# Patient Record
Sex: FEMALE | Race: WHITE | Employment: FULL TIME | URBAN - METROPOLITAN AREA
[De-identification: names, ages, dates, MRNs, and addresses within clinical notes are randomized per-mention and may not be internally consistent; named-entity substitution may affect disease eponyms.]

---

## 2017-05-18 PROBLEM — O92.29 POSTPARTUM NIPPLE PAIN: Status: ACTIVE | Noted: 2017-05-18

## 2017-09-22 PROBLEM — O92.29 POSTPARTUM NIPPLE PAIN: Status: RESOLVED | Noted: 2017-05-18 | Resolved: 2017-09-22

## 2017-09-22 PROBLEM — M75.40 IMPINGEMENT SYNDROME OF SHOULDER REGION: Status: ACTIVE | Noted: 2017-09-22

## 2017-09-22 PROBLEM — M54.9 ACUTE UPPER BACK PAIN: Status: ACTIVE | Noted: 2017-09-22

## 2017-09-22 PROBLEM — M75.20 BICEPS TENDONITIS: Status: ACTIVE | Noted: 2017-09-22

## 2018-03-09 PROBLEM — M75.20 BICEPS TENDONITIS: Status: RESOLVED | Noted: 2017-09-22 | Resolved: 2018-03-09

## 2018-03-09 PROBLEM — M75.40 IMPINGEMENT SYNDROME OF SHOULDER REGION: Status: RESOLVED | Noted: 2017-09-22 | Resolved: 2018-03-09

## 2018-03-09 PROBLEM — K59.00 CONSTIPATION: Status: ACTIVE | Noted: 2018-03-09

## 2018-03-09 PROBLEM — E66.01 OBESITY, MORBID (HCC): Status: ACTIVE | Noted: 2018-03-09

## 2018-03-09 PROBLEM — M54.9 ACUTE UPPER BACK PAIN: Status: RESOLVED | Noted: 2017-09-22 | Resolved: 2018-03-09

## 2018-03-09 PROBLEM — K21.9 GASTROESOPHAGEAL REFLUX DISEASE WITHOUT ESOPHAGITIS: Status: ACTIVE | Noted: 2018-03-09

## 2018-03-16 ENCOUNTER — HOSPITAL ENCOUNTER (OUTPATIENT)
Dept: ULTRASOUND IMAGING | Age: 33
Discharge: HOME | End: 2018-03-16
Attending: FAMILY MEDICINE
Payer: COMMERCIAL

## 2018-03-16 DIAGNOSIS — R10.2 PELVIC PAIN: ICD-10-CM

## 2018-03-16 DIAGNOSIS — N91.2 AMENORRHEA: ICD-10-CM

## 2018-03-16 PROCEDURE — 76830 TRANSVAGINAL US NON-OB: CPT

## 2018-03-21 ENCOUNTER — APPOINTMENT (OUTPATIENT)
Dept: PHYSICAL THERAPY | Age: 33
End: 2018-03-21

## 2018-04-25 PROBLEM — K76.0 FATTY LIVER: Status: ACTIVE | Noted: 2018-04-18

## 2022-03-18 PROBLEM — E66.01 OBESITY, MORBID (HCC): Status: ACTIVE | Noted: 2018-03-09

## 2022-03-19 PROBLEM — K59.00 CONSTIPATION: Status: ACTIVE | Noted: 2018-03-09

## 2022-03-19 PROBLEM — K21.9 GASTROESOPHAGEAL REFLUX DISEASE WITHOUT ESOPHAGITIS: Status: ACTIVE | Noted: 2018-03-09

## 2022-03-19 PROBLEM — K76.0 FATTY LIVER: Status: ACTIVE | Noted: 2018-04-18

## 2024-01-04 ENCOUNTER — OFFICE VISIT (OUTPATIENT)
Dept: ORTHOPEDIC SURGERY | Age: 39
End: 2024-01-04

## 2024-01-04 DIAGNOSIS — G57.91 UNSPECIFIED MONONEUROPATHY OF RIGHT LOWER LIMB: ICD-10-CM

## 2024-01-04 DIAGNOSIS — M79.672 LEFT FOOT PAIN: ICD-10-CM

## 2024-01-04 DIAGNOSIS — M79.671 RIGHT FOOT PAIN: Primary | ICD-10-CM

## 2024-01-04 RX ORDER — FLUTICASONE PROPIONATE AND SALMETEROL 250; 50 UG/1; UG/1
1 POWDER RESPIRATORY (INHALATION)
COMMUNITY
Start: 2023-12-13

## 2024-01-04 RX ORDER — AMOXICILLIN AND CLAVULANATE POTASSIUM 875; 125 MG/1; MG/1
1 TABLET, FILM COATED ORAL 2 TIMES DAILY
Qty: 28 TABLET | Refills: 2 | Status: SHIPPED | OUTPATIENT
Start: 2024-01-04 | End: 2024-01-18

## 2024-01-04 RX ORDER — TORSEMIDE 20 MG/1
20 TABLET ORAL DAILY
COMMUNITY
Start: 2024-01-02

## 2024-01-04 RX ORDER — ALBUTEROL SULFATE 90 UG/1
2 AEROSOL, METERED RESPIRATORY (INHALATION) EVERY 6 HOURS PRN
COMMUNITY
Start: 2023-12-01

## 2024-01-04 RX ORDER — INDOMETHACIN 25 MG/1
25-50 CAPSULE ORAL 3 TIMES DAILY PRN
COMMUNITY
Start: 2023-12-28

## 2024-01-04 RX ORDER — LIDOCAINE 5% 5 G/100G
CREAM TOPICAL
Qty: 45 G | Refills: 2 | Status: SHIPPED | OUTPATIENT
Start: 2024-01-04

## 2024-01-04 RX ORDER — SPIRONOLACTONE 25 MG/1
25 TABLET ORAL DAILY
COMMUNITY
Start: 2024-01-02 | End: 2025-01-01

## 2024-01-04 NOTE — PROGRESS NOTES
The patient was prescribed a walker boot for the patient's bilateral foot. The patient wears a size NA shoe and I fitted them with a XL size boot. The patient was fitted and instructed on the use of prescribed walker boot. I explained how to fit themselves and that the plastic flexible piece should always be on the front of the boot and secured by the Velcro straps on top. The air bladder in the boot was adjusted according to proper fit and comfort. The patient walked a short distance and acknowledged satisfaction with current fit. I also explained that they need a heel lift or a higher heeled shoe for the uninvolved LE to help normalize gait and avoid excessive low back stress/strain due to leg length inequality created from walker boot.    The patient was also prescribed and fitted with bilateral boot sleeves, size medium.     Patient read and signed documenting they understand and agree to [facility]'s current DME return policy.

## 2024-01-04 NOTE — PROGRESS NOTES
Name: Bella Tijerina  YOB: 1985  Gender: female  MRN: [id number]    CC: Right ankle/foot pain: Left foot pain    HPI:   2012: Bilateral ankle/foot injuries: Left treated with rehab: Right midfoot ORIF 2013 2013-2024: Recurrent medial ankle pain, popping, catching: Progressive medial ankle to dorsal medial foot pain  November 2023: She reports swimming jumping into a pool and injuring her left foot.  01/04/2024: Presents with 2 concerns:  1.  Right acute on chronic medial ankle/dorsal foot pain  2.  Left dorsal lateral foot pain status post trauma    ROS/Meds/PSH/PMH/FH/SH: reviewed today    Tobacco:  reports that she quit smoking about 16 years ago. Her smoking use included cigarettes. She has never used smokeless tobacco.     Physical Examination:  Patient appears to be alert and oriented with acceptable appearance.  No obvious distress or SOB  CV: appears to have acceptable vascular color and capillary refill  Neuro: appears to have mostly intact light touch sensation with some loss dorsomedial right foot  Skin: Bilateral lower extremity nonpitting edema; mild redness;  MS: Standing: Bunions: Gait limited with O2 dependency:  Left = dorsal lateral cuboid centered pain  Right = anterior medial ankle pain; naviculocuneiform pain; minimal neuralgia    XR: Right: Left: Standing AP lateral mortise ankle plus AP oblique foot taken today with:  Bilateral = plantar and posterior heel enthesopathy; os trigonum;  Left = moderate bunion; moderate naviculocuneiform sagging; exostosis medial malleolus and 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 Ronak Albert:

## 2024-02-14 ENCOUNTER — TELEPHONE (OUTPATIENT)
Dept: ORTHOPEDIC SURGERY | Age: 39
End: 2024-02-14

## 2024-02-27 ENCOUNTER — OFFICE VISIT (OUTPATIENT)
Dept: ORTHOPEDIC SURGERY | Age: 39
End: 2024-02-27
Payer: MEDICAID

## 2024-02-27 DIAGNOSIS — M76.821 POSTERIOR TIBIAL TENDINITIS OF RIGHT LOWER EXTREMITY: Primary | ICD-10-CM

## 2024-02-27 DIAGNOSIS — M19.171 POST-TRAUMATIC OSTEOARTHRITIS OF RIGHT ANKLE: ICD-10-CM

## 2024-02-27 PROCEDURE — 99213 OFFICE O/P EST LOW 20 MIN: CPT | Performed by: ORTHOPAEDIC SURGERY

## 2024-02-27 RX ORDER — METHOCARBAMOL 750 MG/1
750 TABLET, FILM COATED ORAL 3 TIMES DAILY PRN
COMMUNITY
Start: 2024-02-05

## 2024-02-27 RX ORDER — LOSARTAN POTASSIUM 25 MG/1
25 TABLET ORAL DAILY
COMMUNITY

## 2024-02-27 RX ORDER — LEVALBUTEROL TARTRATE 45 UG/1
AEROSOL, METERED ORAL
COMMUNITY
Start: 2024-01-10

## 2024-02-27 NOTE — PROGRESS NOTES
The patient was prescribed a Wraptor brace for the patient's rightfoot. The patient wears a size 8.5 shoe and I fitted the patient with a XL brace. I explained how to fit the brace properly by pulling the lace tabs across top of foot first then under arch and lastly pulling the strap up firmly and attaching to the lateral Velcro strip. Thus forming a figure 8 across the ankle joint. Once the figure 8 is completed they are to secure the top (short circumferential) straps to help avoid the straps from loosening with normal wear.  The patient was able to demonstrate proper fitting in office to ensure compliance with device and acknowledged satisfaction with current fit. Patient read and signed documenting they understand and agree to [facility]s current DME return policy.   
neuralgia status post midfoot fusion 2013: Prescribed compounding cream and discussed oral gabapentin, alternative steroids and on natural medicine regimen  12/28/2023: No agreeable ED: Reflects right ankle surgery with right ankle pain diagnosed with acute right ankle pain:  12/28/2023: [facility] ED PMH: Chronic HTN: Liver disease: Other diagnoses as listed  12/28/2023: NG ED: XR impression: No acute osseous abnormality [images with retained hardware but no acute fracture noted]  Injection: Hold    02/19/2024: Right ankle MRI scan without contrast: Radiology impression:  1.  Postoperative changes seen in the midfoot limiting evaluation with extensive susceptibility artifact  2.  Irregularity identified along the medial aspect of the talar dome with low T1 signal and faint increased T2 signal suggesting an osteochondral lesion.  No depression or loose fragment he has however identified.  Osteoarthritic changes are seen at the tibiotalar joint with early spurring present  3.  Anterior syndesmotic with fluid seen tracking along the distal tibiofibular joint  4.  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 We explored treatment options.     Her MRI scan reveals mild PTT tenosynovitis  Her exam; however, reveals no PTTD and exam points to her ankle and not PTT as the source of her pain  Regarding her MRI scan findings, her chronic medial ankle pain with popping and catching is consistent with arthritis, exostoses and suspected medial talar dome

## 2024-03-13 ENCOUNTER — HOSPITAL ENCOUNTER (OUTPATIENT)
Dept: PHYSICAL THERAPY | Age: 39
Setting detail: RECURRING SERIES
Discharge: HOME | End: 2024-03-16
Attending: ORTHOPAEDIC SURGERY
Payer: MEDICAID

## 2024-03-13 DIAGNOSIS — M25.571 PAIN IN RIGHT ANKLE AND JOINTS OF RIGHT FOOT: Primary | ICD-10-CM

## 2024-03-13 DIAGNOSIS — R26.2 DIFFICULTY IN WALKING, NOT ELSEWHERE CLASSIFIED: ICD-10-CM

## 2024-03-13 PROCEDURE — 97162 PT EVAL MOD COMPLEX 30 MIN: CPT

## 2024-03-13 NOTE — PROGRESS NOTES
Bella Tijerina  : 1985  Primary: [employer] (Medicaid Managed)  Secondary:  [facility] [place] [address]  Phone: [phone]  Fax: [phone] Plan Frequency: 2 times a week    Plan of Care/Certification Expiration Date: 24        Plan of Care/Certification Expiration Date:  Plan of Care/Certification Expiration Date: 24    Frequency/Duration: Plan Frequency: 2 times a week      Time In/Out:   Time In: 0245  Time Out: 0330      PT Visit Info:         Visit Count:  1    OUTPATIENT PHYSICAL THERAPY:   Treatment Note 3/13/2024       Episode  (R ankle pain )               Treatment Diagnosis:    Pain in right ankle and joints of right foot  Difficulty in walking, not elsewhere classified  Medical/Referring Diagnosis:    Posterior tibial tendinitis of right lower extremity [M76.821]  Post-traumatic osteoarthritis of right ankle [M19.171]      Referring Physician:  Ronak Vazquez MD MD Orders:  PT Eval and Treat   Return MD Appt:  tbd    Date of Onset:  Onset Date: 24     Allergies:   Patient has no known allergies.  Restrictions/Precautions:   None      Interventions Planned (Treatment may consist of any combination of the following):  Current Treatment Recommendations: Strengthening; Balance training; ROM; IADL training; Stair training; Manual; Pain management; Return to work related activity; Home exercise program    See Assessment Note    Subjective Comments:   \"I have a lot of trouble walking and getting the ankle comfortable.\"   Initial Pain Level::      /10  Post Session Pain Level:        /10  Medications Last Reviewed:  3/13/2024  Updated Objective Findings:  See Evaluation Note from today  Treatment   TREATMENT:   THERAPEUTIC ACTIVITY: ( see below for minutes): Therapeutic activities per grid below to improve mobility, strength, balance and coordination. Required minimal visual, verbal, manual and

## 2024-03-15 ENCOUNTER — HOSPITAL ENCOUNTER (OUTPATIENT)
Dept: PHYSICAL THERAPY | Age: 39
Setting detail: RECURRING SERIES
Discharge: HOME | End: 2024-03-18
Attending: ORTHOPAEDIC SURGERY
Payer: MEDICAID

## 2024-03-15 PROCEDURE — 97110 THERAPEUTIC EXERCISES: CPT

## 2024-03-15 PROCEDURE — 97016 VASOPNEUMATIC DEVICE THERAPY: CPT

## 2024-03-15 NOTE — PROGRESS NOTES
improve independence and safety with daily activities .  THERAPEUTIC EXERCISE: (see below for minutes): Exercises per grid below to improve mobility, strength, balance and coordination. Required minimal verbal and manual cues to promote proper body alignment, promote proper body posture and promote proper body mechanics. Progressed resistance, range, repetitions and complexity of movement as indicated.  MANUAL THERAPY: (see below for minutes): Joint mobilization and Soft tissue mobilization was utilized and necessary because of the patient's restricted joint motion, painful spasm, loss of articular motion and restricted motion of soft tissue.   MODALITIES: (see below for minutes): to decrease pain   SELF CARE: (see below for minutes): Procedure(s) (per grid) utilized to improve and/or restore self-care/home management as related to dressing, bathing and grooming. Required minimal verbal cueing to facilitate activities of daily living skills and compensatory activities    Date: 3-13-24  (EVAL only) 3-15-24  (Visit 2)       Modalities:  15 mins       Gameready   15 mins R foot                       Therapeutic Exercise:  30 mins       Seated arch lifts  X10 with 5SH  (Discontinued for HEP due to pain)       Seated heel raises  3x10  Repeat with towel roll under toes       Seated toe press  X10 with 5SH  Toe press with red band 3x10       Seated calf stretch with strap  3x15SH  Repeat       Seated nghia disc   2x20 (pain medial ankle)   DF/PF  Lateral   Circles       Manual mobilization R ankle   PROM and mobilization R ankle prior to exercises       Toe abduction resisted   Seated with foot ER resisted red band at great toe         Towel toe curls 3x10         Ankle 4 way with red band x 20 light resistance      Manual Therapy:                        Functional Activities:                                            Treatment/Session Summary:    Treatment Assessment:   Changed toe yoga exercise to toe press with red

## 2024-03-19 ENCOUNTER — HOSPITAL ENCOUNTER (OUTPATIENT)
Dept: PHYSICAL THERAPY | Age: 39
Setting detail: RECURRING SERIES
Discharge: HOME | End: 2024-03-22
Attending: ORTHOPAEDIC SURGERY
Payer: MEDICAID

## 2024-03-19 PROCEDURE — 97016 VASOPNEUMATIC DEVICE THERAPY: CPT

## 2024-03-19 PROCEDURE — 97110 THERAPEUTIC EXERCISES: CPT

## 2024-03-19 NOTE — PROGRESS NOTES
Bella Tijerina  : 1985  Primary: [employer] (Medicaid Managed)  Secondary:  [facility] [place] [address]  Phone: [phone]  Fax: [phone] Plan Frequency: 2 times a week    Plan of Care/Certification Expiration Date: 24        Plan of Care/Certification Expiration Date:  Plan of Care/Certification Expiration Date: 24    Frequency/Duration: Plan Frequency: 2 times a week      Time In/Out:   Time In: 1203  Time Out: 1251      PT Visit Info:         Visit Count:  3    OUTPATIENT PHYSICAL THERAPY:   Treatment Note 3/19/2024       Episode  (R ankle pain )               Treatment Diagnosis:    Pain in right ankle and joints of right foot  Difficulty in walking, not elsewhere classified  Medical/Referring Diagnosis:    Posterior tibial tendinitis of right lower extremity [M76.821]  Post-traumatic osteoarthritis of right ankle [M19.171]      Referring Physician:  Ronak Vazquez MD MD Orders:  PT Eval and Treat   Return MD Appt:  tbd    Date of Onset:  Onset Date: 24     Allergies:   Patient has no known allergies.  Restrictions/Precautions:   None      Interventions Planned (Treatment may consist of any combination of the following):  Current Treatment Recommendations: Strengthening; Balance training; ROM; IADL training; Stair training; Manual; Pain management; Return to work related activity; Home exercise program    See Assessment Note    Subjective Comments:   Bella reports ankle pain (posterior), forefoot pain during seated calf raises. Foot is about the same so far. Cryotherapy helpful in moderating pain.   Initial Pain Level::     5 /10  Post Session Pain Level:       4 /10  Medications Last Reviewed:  3/19/2024  Updated Objective Findings:  None Today  Treatment   TREATMENT:   THERAPEUTIC ACTIVITY: ( see below for minutes): Therapeutic activities per grid below to improve mobility, strength,

## 2024-03-22 ENCOUNTER — HOSPITAL ENCOUNTER (OUTPATIENT)
Dept: PHYSICAL THERAPY | Age: 39
Setting detail: RECURRING SERIES
Discharge: HOME | End: 2024-03-25
Attending: ORTHOPAEDIC SURGERY
Payer: MEDICAID

## 2024-03-22 PROCEDURE — 97016 VASOPNEUMATIC DEVICE THERAPY: CPT

## 2024-03-22 PROCEDURE — 97110 THERAPEUTIC EXERCISES: CPT

## 2024-03-22 NOTE — PROGRESS NOTES
Bella Tijerina  : 1985  Primary: [employer] (Medicaid Managed)  Secondary:  [facility] [place] [address]  Phone: [phone]  Fax: [phone] Plan Frequency: 2 times a week    Plan of Care/Certification Expiration Date: 24        Plan of Care/Certification Expiration Date:  Plan of Care/Certification Expiration Date: 24    Frequency/Duration: Plan Frequency: 2 times a week      Time In/Out:   Time In: 0800  Time Out: 0900      PT Visit Info:         Visit Count:  4    OUTPATIENT PHYSICAL THERAPY:   Treatment Note 3/22/2024       Episode  (R ankle pain )               Treatment Diagnosis:    Pain in right ankle and joints of right foot  Difficulty in walking, not elsewhere classified  Medical/Referring Diagnosis:    Posterior tibial tendinitis of right lower extremity [M76.821]  Post-traumatic osteoarthritis of right ankle [M19.171]      Referring Physician:  Ronak Vazquez MD MD Orders:  PT Eval and Treat   Return MD Appt:  tbd    Date of Onset:  Onset Date: 24     Allergies:   Patient has no known allergies.  Restrictions/Precautions:   None      Interventions Planned (Treatment may consist of any combination of the following):  Current Treatment Recommendations: Strengthening; Balance training; ROM; IADL training; Stair training; Manual; Pain management; Return to work related activity; Home exercise program    See Assessment Note    Subjective Comments:   Bella reports much less ankle pain today.  Initial Pain Level::    mild pain in R ankle  Post Session Pain Level:         Medications Last Reviewed:  3/22/2024  Updated Objective Findings:  None Today  Treatment   TREATMENT:   THERAPEUTIC ACTIVITY: ( see below for minutes): Therapeutic activities per grid below to improve mobility, strength, balance and coordination. Required minimal visual, verbal, manual and tactile cues to improve independence

## 2024-03-25 ENCOUNTER — APPOINTMENT (OUTPATIENT)
Dept: PHYSICAL THERAPY | Age: 39
End: 2024-03-25
Attending: ORTHOPAEDIC SURGERY
Payer: MEDICAID

## 2024-03-26 ENCOUNTER — HOSPITAL ENCOUNTER (OUTPATIENT)
Dept: PHYSICAL THERAPY | Age: 39
Setting detail: RECURRING SERIES
Discharge: HOME | End: 2024-03-29
Attending: ORTHOPAEDIC SURGERY
Payer: MEDICAID

## 2024-03-26 PROCEDURE — 97110 THERAPEUTIC EXERCISES: CPT

## 2024-03-26 PROCEDURE — 97016 VASOPNEUMATIC DEVICE THERAPY: CPT

## 2024-03-26 NOTE — PROGRESS NOTES
grid below to improve mobility, strength, balance and coordination. Required minimal visual, verbal, manual and tactile cues to improve independence and safety with daily activities .  THERAPEUTIC EXERCISE: (see below for minutes): Exercises per grid below to improve mobility, strength, balance and coordination. Required minimal verbal and manual cues to promote proper body alignment, promote proper body posture and promote proper body mechanics. Progressed resistance, range, repetitions and complexity of movement as indicated.  MANUAL THERAPY: (see below for minutes): Joint mobilization and Soft tissue mobilization was utilized and necessary because of the patient's restricted joint motion, painful spasm, loss of articular motion and restricted motion of soft tissue.   MODALITIES: (see below for minutes): to decrease pain   SELF CARE: (see below for minutes): Procedure(s) (per grid) utilized to improve and/or restore self-care/home management as related to dressing, bathing and grooming. Required minimal verbal cueing to facilitate activities of daily living skills and compensatory activities    Date: 3-13-24  (EVAL only) 3-15-24  (Visit 2)  3/19/24 3/22/24 (visit 4) 3/26/24 (visit 5)   Modalities:  15 mins  11 min  15 min 15 min   Gameready   15 mins R foot  Vaso cryo x 11 min, 34 deg F, mod compression R ankle/foot R ankle, low compression, coldest setting R ankle elevated, low compression, coldest setting                   Therapeutic Exercise:  30 mins  37 min 40 min 43 min   Seated arch lifts  X10 with 5SH  (Discontinued for HEP due to pain)    Shuffle walk x 2 laps near //bars   Seated heel raises  3x10  Repeat with towel roll under toes  30x bilat, feet on aires Seated with 10# 3x10 Seated 3x10, 10#   Seated toe press  X10 with 5SH  Toe press with red band 3x10  Toe presses w/ red tband x 30  Toe presses into red band x30 Toe presses into green band x30   Seated calf stretch with strap  3x15SH  Repeat

## 2024-03-27 ENCOUNTER — APPOINTMENT (OUTPATIENT)
Dept: PHYSICAL THERAPY | Age: 39
End: 2024-03-27
Attending: ORTHOPAEDIC SURGERY
Payer: MEDICAID

## 2024-03-29 ENCOUNTER — HOSPITAL ENCOUNTER (OUTPATIENT)
Dept: PHYSICAL THERAPY | Age: 39
Setting detail: RECURRING SERIES
Discharge: HOME | End: 2024-04-01
Attending: ORTHOPAEDIC SURGERY
Payer: MEDICAID

## 2024-03-29 PROCEDURE — 97110 THERAPEUTIC EXERCISES: CPT

## 2024-03-29 PROCEDURE — 97016 VASOPNEUMATIC DEVICE THERAPY: CPT

## 2024-03-29 NOTE — PROGRESS NOTES
presses into green band x30 Toe presses into green band x30   Seated calf stretch with strap  3x15SH  Repeat    Manual stretching  Manual stretching   Seated nghia disc   2x20 (pain medial ankle)   DF/PF  Lateral   Circles  D/P tipping x 2 min, cw/ccw circles x 3 min  A/P x30, and circles x30 each, seated A/P x30, circles x30 ea seated Standing MOBO 2/4 with weight on L LE x30 A/P tipping   Manual mobilization R ankle   PROM and mobilization R ankle prior to exercises  Repeat  PROM and mobilizationto R ankle prior to exercise PROM into DF, PF, IV/EV PROM into DF, PF, IV and EV   Toe abduction resisted   Seated with foot ER resisted red band at great toe  Active toe spreads x 20  Active toe spreads x30 Active toe spreads x30 Toe abduction and toe extension x30 each seated     Towel toe curls 3x10   SLS 30 sec hold x3 R, with UE support Staggered stance 30 sec hold x6 B Tandem stance 30 sec hold x 6 B no UE support    SLS 30 sec hold x 6 B with intermittent UE support     Ankle 4 way with red band x 20 light resistance Ankle 3 way w/ red band resist 2 x 20 each D/I/E Ankle 3 way with red band x30 ea Ankle 4 way black 3x10 ea Ankle 4 way x30 ea black   Manual Therapy:                           Functional Activities:                                                 Treatment/Session Summary:    Treatment Assessment:   Pt demonstrates improved balance but standing heel raises are still painful. Continue POC.  Communication/Consultation:  None today  Equipment provided today:  HEP  Recommendations/Intent for next treatment session: Next visit will focus on stretching and mobilizations.     >Total Treatment Billable Duration:  55 minutes   Time In: 0800  Time Out: 0900    Dhara Conn PTA         Charge Capture  [employer] Portal  Appt Desk     Future Appointments   Date Time Provider Department Center   4/2/2024  2:45 PM Dhara Conn PTA [facility]   4/5/2024  2:45 PM Dhara Conn PTA [facility]   4/9/2024

## 2024-04-02 ENCOUNTER — HOSPITAL ENCOUNTER (OUTPATIENT)
Dept: PHYSICAL THERAPY | Age: 39
Setting detail: RECURRING SERIES
Discharge: HOME | End: 2024-04-05
Attending: ORTHOPAEDIC SURGERY
Payer: MEDICAID

## 2024-04-02 PROCEDURE — 97110 THERAPEUTIC EXERCISES: CPT

## 2024-04-02 PROCEDURE — 97140 MANUAL THERAPY 1/> REGIONS: CPT

## 2024-04-02 PROCEDURE — 97016 VASOPNEUMATIC DEVICE THERAPY: CPT

## 2024-04-02 NOTE — PROGRESS NOTES
verbal, manual and tactile cues to improve independence and safety with daily activities .  THERAPEUTIC EXERCISE: (see below for minutes): Exercises per grid below to improve mobility, strength, balance and coordination. Required minimal verbal and manual cues to promote proper body alignment, promote proper body posture and promote proper body mechanics. Progressed resistance, range, repetitions and complexity of movement as indicated.  MANUAL THERAPY: (see below for minutes): Joint mobilization and Soft tissue mobilization was utilized and necessary because of the patient's restricted joint motion, painful spasm, loss of articular motion and restricted motion of soft tissue.   MODALITIES: (see below for minutes): to decrease pain   SELF CARE: (see below for minutes): Procedure(s) (per grid) utilized to improve and/or restore self-care/home management as related to dressing, bathing and grooming. Required minimal verbal cueing to facilitate activities of daily living skills and compensatory activities    Date: 3-13-24  (EVAL only) 3-15-24  (Visit 2)  3/19/24 3/22/24 (visit 4) 3/26/24 (visit 5) 3/29/24 (visit 6) 4/2/24 (visit 7)   Modalities:  15 mins  11 min  15 min 15 min 15 min 15 min   Gameready   15 mins R foot  Vaso cryo x 11 min, 34 deg F, mod compression R ankle/foot R ankle, low compression, coldest setting R ankle elevated, low compression, coldest setting R ankle elevated, low compression, coldest setting R ankle elevated, low compression, coldest setting                       Therapeutic Exercise:  30 mins  37 min 40 min 43 min 40 min 15 min   Seated arch lifts  X10 with 5SH  (Discontinued for HEP due to pain)    Shuffle walk x 2 laps near //bars Standing gastroc stretch off end of //bars, x 1 min x2 Ankle 4 way with blue band 3x10 ea direction   Seated heel raises  3x10  Repeat with towel roll under toes  30x bilat, feet on aires Seated with 10# 3x10 Seated 3x10, 10# Seated 3x10, 20# Seated 3x10

## 2024-04-03 ENCOUNTER — TELEPHONE (OUTPATIENT)
Dept: ORTHOPEDIC SURGERY | Age: 39
End: 2024-04-03

## 2024-04-03 NOTE — TELEPHONE ENCOUNTER
LM for pt to call the office back. Wanted to ask if pt wanted to do surgery or try a custom brace. If surgery, pt needs to be rescheduled with surgical partner. If pt wants to try the custom brace, [facility] is more than happy to see.

## 2024-04-05 ENCOUNTER — HOSPITAL ENCOUNTER (OUTPATIENT)
Dept: PHYSICAL THERAPY | Age: 39
Setting detail: RECURRING SERIES
End: 2024-04-05
Attending: ORTHOPAEDIC SURGERY
Payer: MEDICAID

## 2024-04-05 ENCOUNTER — APPOINTMENT (OUTPATIENT)
Dept: PHYSICAL THERAPY | Age: 39
End: 2024-04-05
Attending: ORTHOPAEDIC SURGERY
Payer: MEDICAID

## 2024-04-08 ENCOUNTER — TELEPHONE (OUTPATIENT)
Dept: ORTHOPEDIC SURGERY | Age: 39
End: 2024-04-08

## 2024-04-09 ENCOUNTER — HOSPITAL ENCOUNTER (OUTPATIENT)
Dept: PHYSICAL THERAPY | Age: 39
Setting detail: RECURRING SERIES
Discharge: HOME | End: 2024-04-12
Attending: ORTHOPAEDIC SURGERY
Payer: MEDICAID

## 2024-04-09 PROCEDURE — 97110 THERAPEUTIC EXERCISES: CPT

## 2024-04-09 PROCEDURE — 97016 VASOPNEUMATIC DEVICE THERAPY: CPT

## 2024-04-09 NOTE — PROGRESS NOTES
spreads x30 Toe abduction and toe extension x30 each seated       Towel toe curls 3x10   SLS 30 sec hold x3 R, with UE support Staggered stance 30 sec hold x6 B Tandem stance 30 sec hold x 6 B no UE support    SLS 30 sec hold x 6 B with intermittent UE support  Weight shifts forward 3x10      Ankle 4 way with red band x 20 light resistance Ankle 3 way w/ red band resist 2 x 20 each D/I/E Ankle 3 way with red band x30 ea Ankle 4 way black 3x10 ea Ankle 4 way x30 ea black     Manual Therapy:       15 min 5 min          Edema massage to R ankle and foot with elevation Edema massage to R ankle and foot with elevation          PROM to R ankle in all directions    Functional Activities:                                                           Treatment/Session Summary:    Treatment Assessment:   Pt reported pain in the R foot and ankle with all exercises and PROM. She also has increased edema in the R ankle, compared to the left. She reports constant pain and reports a catching sensation with ambulation.  Communication/Consultation:  None today  Equipment provided today:  HEP  Recommendations/Intent for next treatment session: Next visit will focus on stretching and mobilizations.     >Total Treatment Billable Duration:  45 minutes   Time In: 1615  Time Out: 1700    Dhara Conn PTA         Charge Capture  [employer] Portal  Appt Desk     Future Appointments   Date Time Provider Department Center   4/11/2024 11:15 AM Ronak Vazquez MD [facility]   4/11/2024  4:15 PM Dhara Conn, PTA [facility]   4/16/2024  2:45 PM Dhara Conn, PTA [facility]   4/18/2024  2:45 PM Dhara Conn, PTA [facility]   4/23/2024  2:00 PM Dhara Conn, PTA [facility]   4/25/2024  2:00 PM Dhara Conn, PTA [facility]   4/29/2024  3:30 PM Dhara Conn, PTA [facility]   4/30/2024  2:00 PM Dhara Conn, PTA [facility]

## 2024-04-10 ENCOUNTER — TELEPHONE (OUTPATIENT)
Dept: ORTHOPEDIC SURGERY | Age: 39
End: 2024-04-10

## 2024-04-10 NOTE — PROGRESS NOTES
Pt called and cancelled remainder of appts due to medical issues. She is discontinued at this time.

## 2024-04-10 NOTE — TELEPHONE ENCOUNTER
Received instructions from Dr. Vazquez's team to call the patient to schedule a surgical consult with Dr. Rhodes or Dr. Albert.  I left patient a voice message stating that she can call back to speak with anyone in appointments to schedule a surgical consultation with Dr. Rhodes or Dr. Albert.

## 2024-04-10 NOTE — TELEPHONE ENCOUNTER
Returned pt's call. Due to pt wanting to go the surgery route, apts will contact pt to set up an apt with one of [facility]'s surgical partners.

## 2024-04-11 ENCOUNTER — HOSPITAL ENCOUNTER (OUTPATIENT)
Dept: PHYSICAL THERAPY | Age: 39
Setting detail: RECURRING SERIES
End: 2024-04-11
Attending: ORTHOPAEDIC SURGERY
Payer: MEDICAID

## 2024-04-16 ENCOUNTER — APPOINTMENT (OUTPATIENT)
Dept: PHYSICAL THERAPY | Age: 39
End: 2024-04-16
Attending: ORTHOPAEDIC SURGERY
Payer: MEDICAID

## 2024-04-18 ENCOUNTER — APPOINTMENT (OUTPATIENT)
Dept: PHYSICAL THERAPY | Age: 39
End: 2024-04-18
Attending: ORTHOPAEDIC SURGERY
Payer: MEDICAID

## 2024-04-23 ENCOUNTER — APPOINTMENT (OUTPATIENT)
Dept: PHYSICAL THERAPY | Age: 39
End: 2024-04-23
Attending: ORTHOPAEDIC SURGERY
Payer: MEDICAID

## 2024-04-25 ENCOUNTER — APPOINTMENT (OUTPATIENT)
Dept: PHYSICAL THERAPY | Age: 39
End: 2024-04-25
Attending: ORTHOPAEDIC SURGERY
Payer: MEDICAID

## 2024-04-29 ENCOUNTER — APPOINTMENT (OUTPATIENT)
Dept: PHYSICAL THERAPY | Age: 39
End: 2024-04-29
Attending: ORTHOPAEDIC SURGERY
Payer: MEDICAID

## 2024-04-30 ENCOUNTER — APPOINTMENT (OUTPATIENT)
Dept: PHYSICAL THERAPY | Age: 39
End: 2024-04-30
Attending: ORTHOPAEDIC SURGERY
Payer: MEDICAID